# Patient Record
Sex: FEMALE | Race: WHITE | NOT HISPANIC OR LATINO | Employment: OTHER | URBAN - METROPOLITAN AREA
[De-identification: names, ages, dates, MRNs, and addresses within clinical notes are randomized per-mention and may not be internally consistent; named-entity substitution may affect disease eponyms.]

---

## 2023-09-28 DIAGNOSIS — Q27.9 VASCULAR MALFORMATION: Primary | ICD-10-CM

## 2023-10-05 ENCOUNTER — HOSPITAL ENCOUNTER (OUTPATIENT)
Dept: MRI IMAGING | Facility: CLINIC | Age: 68
Discharge: HOME | End: 2023-10-05
Attending: RADIOLOGY | Admitting: RADIOLOGY
Payer: COMMERCIAL

## 2023-10-05 DIAGNOSIS — Q27.9 VASCULAR MALFORMATION: ICD-10-CM

## 2023-10-05 PROCEDURE — A9585 GADOBUTROL INJECTION: HCPCS | Mod: JZ | Performed by: RADIOLOGY

## 2023-10-05 PROCEDURE — 255N000002 HC RX 255 OP 636: Mod: JZ | Performed by: RADIOLOGY

## 2023-10-05 PROCEDURE — 72157 MRI CHEST SPINE W/O & W/DYE: CPT

## 2023-10-05 RX ORDER — GADOBUTROL 604.72 MG/ML
7.5 INJECTION INTRAVENOUS ONCE
Status: COMPLETED | OUTPATIENT
Start: 2023-10-05 | End: 2023-10-05

## 2023-10-05 RX ADMIN — GADOBUTROL 6.5 ML: 604.72 INJECTION INTRAVENOUS at 14:58

## 2023-10-19 PROBLEM — H52.10 MYOPIA: Status: ACTIVE | Noted: 2017-09-07

## 2023-10-19 PROBLEM — N64.59 INVERTED NIPPLE: Status: ACTIVE | Noted: 2023-10-19

## 2023-10-19 PROBLEM — D51.9 B12 DEFICIENCY ANEMIA: Status: ACTIVE | Noted: 2023-08-10

## 2023-10-19 PROBLEM — H25.019 CORTICAL SENILE CATARACT: Status: ACTIVE | Noted: 2022-11-14

## 2023-10-26 ENCOUNTER — OFFICE VISIT (OUTPATIENT)
Dept: VASCULAR SURGERY | Facility: CLINIC | Age: 68
End: 2023-10-26
Payer: COMMERCIAL

## 2023-10-26 VITALS — OXYGEN SATURATION: 93 % | DIASTOLIC BLOOD PRESSURE: 81 MMHG | SYSTOLIC BLOOD PRESSURE: 120 MMHG | HEART RATE: 67 BPM

## 2023-10-26 DIAGNOSIS — D18.09 SPINAL HEMANGIOMA: ICD-10-CM

## 2023-10-26 DIAGNOSIS — D18.09 HEMANGIOMA OF SPINE: Primary | ICD-10-CM

## 2023-10-26 PROCEDURE — 99204 OFFICE O/P NEW MOD 45 MIN: CPT | Mod: GC | Performed by: RADIOLOGY

## 2023-10-26 RX ORDER — CARVEDILOL 25 MG/1
25 TABLET ORAL 2 TIMES DAILY
COMMUNITY

## 2023-10-26 RX ORDER — CYANOCOBALAMIN 1000 UG/ML
1 INJECTION, SOLUTION INTRAMUSCULAR; SUBCUTANEOUS
COMMUNITY
Start: 2023-09-20

## 2023-10-26 ASSESSMENT — PAIN SCALES - GENERAL: PAINLEVEL: NO PAIN (0)

## 2023-10-26 NOTE — LETTER
10/26/2023       RE: Sima Ricardo  [address] [facility] [address]     Dear Colleague,    Thank you for referring your patient, Sima Ricardo, to the [facility] [place] at [facility]. Please see a copy of my visit note below.        INTERVENTIONAL RADIOLOGY CONSULTATION    Name: Sima Ricardo  Age: 68 year old   Referring Physician: Dr. Henderson   REASON FOR REFERRAL: Painful T10 hmangioma     HPI: HPI: Sima first noted mid back pain about 3 years ago. It started in the middle of her back and wrapped around her ribs into her sternum. She then would notice an electrical pain in her groin/legs. She also noted a feeling of water dripping down her back. At first, she thought this was related to muscle pains, as she was going through a move when this first started 3 years ago. Because of that, she underwent therapy and chiropractor care, which only exacerbated her symptoms. She denies ever having bladder or bowel incontinence, symptoms of foot drop, or electrical shock running down her legs. Further work up following conservative therapies included MRI which showed T10 hemangioma. She has seen neurosurgery for this and tried to be seen down at [place] for consult, but no appointments were available.    Now she feels a sensation where something is rolling down her back every day. Sneezing and taking deep breath exacerbates pain in her sternum/chest. The pain is always worse in the middle/late afternoon after a day of activity. It is limiting her daily activities now to the point where she can't walk the 2 miles she used to every day due to pain. She has difficulty sitting on her piano bench to play.    PAST MEDICAL HISTORY:   No past medical history on file.    PAST SURGICAL HISTORY:   No past surgical history on file.    FAMILY HISTORY:   No family history on file.    SOCIAL HISTORY:   Social History     Tobacco Use    Smoking  status: Never    Smokeless tobacco: Never   Substance Use Topics    Alcohol use: Not on file       PROBLEM LIST:   Patient Active Problem List    Diagnosis Date Noted    Hemangioma of spine 10/27/2023     Priority: Medium    Inverted nipple 10/19/2023     Priority: Medium     Formatting of this note might be different from the original. rt slightly      B12 deficiency anemia 08/10/2023     Priority: Medium    Cortical senile cataract 11/14/2022     Priority: Medium    Myopia 09/07/2017     Priority: Medium    Hyperlipidemia 08/04/2016     Priority: Medium     Formatting of this note might be different from the original. 8/5/2016 - declines statin at this time, will work on diet and exercise, recheck labs in 3 months. Formatting of this note might be different from the original. Formatting of this note might be different from the original. 8/5/2016 - declines statin at this time, will work on diet and exercise, recheck labs in 3 months.      Osteopenia 07/16/2015     Priority: Medium    Prediabetes 06/25/2015     Priority: Medium     Formatting of this note might be different from the original. 6/25/2015 Hgb A1C 5.9 Formatting of this note might be different from the original. Formatting of this note might be different from the original. 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 nutrasweet      Deafness in right ear 02/28/2013     Priority: Medium     Formatting of this note might be different from the original. Post Q tip trauma         MEDICATIONS:   Prescription Medications as of 11/13/2023         Rx Number Disp Refills Start End Last Dispensed Date Next Fill Date Owning Pharmacy    Aspirin 81 MG CAPS    7/27/2023        Sig: Take by mouth.     Class: Historical    Route: Oral    carvedilol (COREG) 25 MG tablet            Sig: Take 25 mg by mouth 2 times daily    Class: Historical    Route: Oral    cholecalciferol (VITAMIN D3) 10 mcg (400 units) TABS tablet            Sig: Take 25 mcg by mouth 2 times daily    Class: Historical    Route: Oral    cyanocobalamin (CYANOCOBALAMIN) 1000 MCG/ML injection    9/20/2023        Sig: Inject 1 mL into the muscle every 14 days    Class: Historical    Route: Intramuscular            ALLERGIES:   Aspartame, Cephalexin, and Adhesive tape    ROS:  An 11 point review of system was performed and all pertinent and negative findings are mentioned in the HPI section of this note.      Physical Examination:   VITALS:   /81 (BP Location: Right arm, Patient Position: Chair, Cuff Size: Adult Regular)   Pulse 67   SpO2 93%   General:  Pt sitting in chair comfortably.  No acute distress  HEENT: normocephalic.    Neck: no JVD  Resp: nonlabored.    CV: RRR.   Abd: nontender, soft  Spine: T10 tenderness to palpation, no paraspinal muscle tenderness to palpation. Normal ROM.  Extremities: 5/5 hip and knee flexion and extension. Sensation equal and intact of lower extremities.   Ext/Vasc: No ulcerations or skin changes.  No large varicosities.  Symmetrical 2+ DP and PT pulses.   Lymph: no pedal edema  Neuro: Oriented x3.  No evidence of focal motor deficits  Mood: appropriate affect    Labs/Diagnostic studies:   MR Thoracic Spine 10/5/23  1. Presumed benign hemangioma in the T10 vertebral body.  2. Otherwise, normal thoracic spine MRI.     Assessment   Sima Ricardo is a 68 year old female with lifestyle limiting back pain in the setting of T10 hemangioma. It is difficult to show a definitive causal relationship between the T10 hemangioma and the back pain she is experiencing. Factors that favor contribution of the hemangioma to her back pain symptoms include tenderness of the T10 vertebra to palpation, the lack of other causes  of back pain present on the MRI, as well as her symptom presentation can be similar to others presenting with symptomatic hemangiomas. With that being said, hemangiomas typically don't cause symptoms so this is overall unusual if her hemangioma is symptomatic. This lack of definitive causal relationship was explained to the patient and her  who were understanding. Clinical suspicion, however, favors that the hemangioma is contributing to her back pain, at least in some degree, especially given other causes of back pain can be ruled out on the MRI including radiculopathy, bulging disk, osteoarthritis and paraspinal muscles are not tender to palpation making muscular problems less likely.     The treatment options include percutaneous injections, endovascular embolizations and in my opinion for this particular lesion the optimal option is radiofrequency ablation with vertebral augmentation. The risks of this procedure were described in detail, including but not limited to risk of bleeding, infection, CSF leak, cement extravasation, pathologiocal pain, worsened pain and neurological damage from spinal cord injury. The potential benefits of the procedure were explained, notably a possible reduction in pain. Of course, there is a risk that her back pain does not improve following the procedure if the hemangioma is not contributing to her symptoms. I shared with them that I have been a consultant for [employer] the company that makes the device I would use for their treatment. All their questions were answered. Patient and  understand these risks and are wanting to undergo treatment in the hopes it will help improve her lifestyle limiting back pain. I will also discuss this patient's case with colleagues who perform the other treatments for this entity to make sure we select the most optimal approach for this particular lesion/patient.    Plan   - RFA and biopsy with vertebral augmentation of T10 hemangioma        Tea Cruz  Medical Student, [employer]    Physician Attestation   I, Johnson Brady MD, was present with the medical/REINALDO student who participated in the service and in the documentation of the note.  I have verified the history and personally performed the physical exam and medical decision making.  I agree with the assessment and plan of care as documented in the note.      Items personally reviewed: vitals, labs and imaging and agree with the interpretation documented in the note.          Again, thank you for allowing me to participate in the care of your patient.      Sincerely,    Johnson Brady MD

## 2023-10-26 NOTE — PROGRESS NOTES
INTERVENTIONAL RADIOLOGY CONSULTATION    Name: Sima Ricardo  Age: 68 year old   Referring Physician: Dr. Henderson   REASON FOR REFERRAL: Painful T10 hmangioma     HPI: HPI: Sima first noted mid back pain about 3 years ago. It started in the middle of her back and wrapped around her ribs into her sternum. She then would notice an electrical pain in her groin/legs. She also noted a feeling of water dripping down her back. At first, she thought this was related to muscle pains, as she was going through a move when this first started 3 years ago. Because of that, she underwent therapy and chiropractor care, which only exacerbated her symptoms. She denies ever having bladder or bowel incontinence, symptoms of foot drop, or electrical shock running down her legs. Further work up following conservative therapies included MRI which showed T10 hemangioma. She has seen neurosurgery for this and tried to be seen down at [place] for consult, but no appointments were available.    Now she feels a sensation where something is rolling down her back every day. Sneezing and taking deep breath exacerbates pain in her sternum/chest. The pain is always worse in the middle/late afternoon after a day of activity. It is limiting her daily activities now to the point where she can't walk the 2 miles she used to every day due to pain. She has difficulty sitting on her piano bench to play.    PAST MEDICAL HISTORY:   No past medical history on file.    PAST SURGICAL HISTORY:   No past surgical history on file.    FAMILY HISTORY:   No family history on file.    SOCIAL HISTORY:   Social History     Tobacco Use     Smoking status: Never     Smokeless tobacco: Never   Substance Use Topics     Alcohol use: Not on file       PROBLEM LIST:   Patient Active Problem List    Diagnosis Date Noted     Hemangioma of spine 10/27/2023     Priority: Medium     Inverted nipple 10/19/2023     Priority: Medium     Formatting of this note might be  400mg TID x 5 days    different from the original. 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 8/5/2016 - declines statin at this time, will work on diet and exercise, recheck labs in 3 months. Formatting of this note might be different from the original. Formatting of this note might be different from the original. 8/5/2016 - declines statin at this time, will work on diet and exercise, recheck labs in 3 months.       Osteopenia 07/16/2015     Priority: Medium     Prediabetes 06/25/2015     Priority: Medium     Formatting of this note might be different from the original. 6/25/2015 Hgb A1C 5.9 Formatting of this note might be different from the original. Formatting of this note might be different from the original. 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 nutrasweet       Deafness in right ear 02/28/2013     Priority: Medium     Formatting of this note might be different from the original. Post Q tip trauma         MEDICATIONS:   Prescription Medications as of 11/13/2023       Rx Number Disp Refills Start End Last Dispensed Date Next Fill Date Owning Pharmacy    Aspirin 81 MG CAPS    7/27/2023        Sig: Take by mouth.    Class: Historical    Route: Oral    carvedilol (COREG) 25 MG tablet            Sig: Take 25 mg by mouth 2 times daily    Class: Historical    Route: Oral    cholecalciferol (VITAMIN D3) 10 mcg (400 units) TABS tablet            Sig: Take 25 mcg by mouth 2 times daily    Class: Historical    Route:  Oral    cyanocobalamin (CYANOCOBALAMIN) 1000 MCG/ML injection    9/20/2023        Sig: Inject 1 mL into the muscle every 14 days    Class: Historical    Route: Intramuscular          ALLERGIES:   Aspartame, Cephalexin, and Adhesive tape    ROS:  An 11 point review of system was performed and all pertinent and negative findings are mentioned in the HPI section of this note.      Physical Examination:   VITALS:   /81 (BP Location: Right arm, Patient Position: Chair, Cuff Size: Adult Regular)   Pulse 67   SpO2 93%   General:  Pt sitting in chair comfortably.  No acute distress  HEENT: normocephalic.    Neck: no JVD  Resp: nonlabored.    CV: RRR.   Abd: nontender, soft  Spine: T10 tenderness to palpation, no paraspinal muscle tenderness to palpation. Normal ROM.  Extremities: 5/5 hip and knee flexion and extension. Sensation equal and intact of lower extremities.   Ext/Vasc: No ulcerations or skin changes.  No large varicosities.  Symmetrical 2+ DP and PT pulses.   Lymph: no pedal edema  Neuro: Oriented x3.  No evidence of focal motor deficits  Mood: appropriate affect    Labs/Diagnostic studies:   MR Thoracic Spine 10/5/23  1. Presumed benign hemangioma in the T10 vertebral body.  2. Otherwise, normal thoracic spine MRI.     Assessment   Sima Ricardo is a 68 year old female with lifestyle limiting back pain in the setting of T10 hemangioma. It is difficult to show a definitive causal relationship between the T10 hemangioma and the back pain she is experiencing. Factors that favor contribution of the hemangioma to her back pain symptoms include tenderness of the T10 vertebra to palpation, the lack of other causes of back pain present on the MRI, as well as her symptom presentation can be similar to others presenting with symptomatic hemangiomas. With that being said, hemangiomas typically don't cause symptoms so this is overall unusual if her hemangioma is symptomatic. This lack of definitive causal  relationship was explained to the patient and her  who were understanding. Clinical suspicion, however, favors that the hemangioma is contributing to her back pain, at least in some degree, especially given other causes of back pain can be ruled out on the MRI including radiculopathy, bulging disk, osteoarthritis and paraspinal muscles are not tender to palpation making muscular problems less likely.     The treatment options include percutaneous injections, endovascular embolizations and in my opinion for this particular lesion the optimal option is radiofrequency ablation with vertebral augmentation. The risks of this procedure were described in detail, including but not limited to risk of bleeding, infection, CSF leak, cement extravasation, pathologiocal pain, worsened pain and neurological damage from spinal cord injury. The potential benefits of the procedure were explained, notably a possible reduction in pain. Of course, there is a risk that her back pain does not improve following the procedure if the hemangioma is not contributing to her symptoms. I shared with them that I have been a consultant for [employer] the company that makes the device I would use for their treatment. All their questions were answered. Patient and  understand these risks and are wanting to undergo treatment in the hopes it will help improve her lifestyle limiting back pain. I will also discuss this patient's case with colleagues who perform the other treatments for this entity to make sure we select the most optimal approach for this particular lesion/patient.    Plan   - RFA and biopsy with vertebral augmentation of T10 hemangioma       Tea Cruz  Medical Student, [employer]    Physician Attestation   I, Johnson Brady MD, was present with the medical/REINALDO student who participated in the service and in the documentation of the note.  I have verified the history and personally performed  the physical exam and medical decision making.  I agree with the assessment and plan of care as documented in the note.      Items personally reviewed: vitals, labs and imaging and agree with the interpretation documented in the note.    Johnson Brady MD      CC  Patient Care Team:  Martha Ojeda NP as PCP - CHERRIE Samuels

## 2023-10-26 NOTE — NURSING NOTE
Chief Complaint   Patient presents with    New Patient     The patient states she feels good today aside from her lower back pain at baseline. She states the pain is worsened with physical activity. The patient would like to discuss the hemangioma and receive some education on it and talk about potential treatments. The patient denies any questions or concerns.     Vitals were taken and medications reconciled.    Huber Beauchamp, EMT  10:41 AM

## 2023-10-27 PROBLEM — D18.09 HEMANGIOMA OF SPINE: Status: ACTIVE | Noted: 2023-10-27

## 2023-11-04 ENCOUNTER — HEALTH MAINTENANCE LETTER (OUTPATIENT)
Age: 68
End: 2023-11-04

## 2023-11-14 ENCOUNTER — TELEPHONE (OUTPATIENT)
Dept: INTERVENTIONAL RADIOLOGY/VASCULAR | Facility: CLINIC | Age: 68
End: 2023-11-14

## 2023-11-14 NOTE — TELEPHONE ENCOUNTER
[facility] Call Center    Phone Message    May a detailed message be left on voicemail: no     Reason for Call: Other: Sima Schaefer calling for Ashlie Hess.  Please call [phone]     Action Taken: Message routed to:  [facility] ([facility] Vascular    Travel Screening: Not Applicable

## 2024-02-02 ENCOUNTER — HOSPITAL ENCOUNTER (OUTPATIENT)
Facility: CLINIC | Age: 69
Discharge: HOME | End: 2024-02-02
Attending: RADIOLOGY | Admitting: RADIOLOGY
Payer: COMMERCIAL

## 2024-02-02 ENCOUNTER — APPOINTMENT (OUTPATIENT)
Dept: INTERVENTIONAL RADIOLOGY/VASCULAR | Facility: CLINIC | Age: 69
End: 2024-02-02
Attending: RADIOLOGY
Payer: COMMERCIAL

## 2024-02-02 ENCOUNTER — APPOINTMENT (OUTPATIENT)
Dept: MEDSURG UNIT | Facility: CLINIC | Age: 69
End: 2024-02-02
Attending: RADIOLOGY
Payer: COMMERCIAL

## 2024-02-02 VITALS
RESPIRATION RATE: 16 BRPM | TEMPERATURE: 97.6 F | BODY MASS INDEX: 21.7 KG/M2 | HEART RATE: 55 BPM | WEIGHT: 127.1 LBS | OXYGEN SATURATION: 95 % | DIASTOLIC BLOOD PRESSURE: 76 MMHG | HEIGHT: 64 IN | SYSTOLIC BLOOD PRESSURE: 131 MMHG

## 2024-02-02 DIAGNOSIS — D18.09 SPINAL HEMANGIOMA: ICD-10-CM

## 2024-02-02 LAB
ERYTHROCYTE [DISTWIDTH] IN BLOOD BY AUTOMATED COUNT: 12.9 % (ref 10–15)
HCT VFR BLD AUTO: 38.6 % (ref 35–47)
HGB BLD-MCNC: 12.8 G/DL (ref 11.7–15.7)
INR PPP: 1.05 (ref 0.85–1.15)
MCH RBC QN AUTO: 34.8 PG (ref 26.5–33)
MCHC RBC AUTO-ENTMCNC: 33.2 G/DL (ref 31.5–36.5)
MCV RBC AUTO: 105 FL (ref 78–100)
PLATELET # BLD AUTO: 243 10E3/UL (ref 150–450)
RBC # BLD AUTO: 3.68 10E6/UL (ref 3.8–5.2)
WBC # BLD AUTO: 5.2 10E3/UL (ref 4–11)

## 2024-02-02 PROCEDURE — 999N000142 HC STATISTIC PROCEDURE PREP ONLY

## 2024-02-02 PROCEDURE — 272N000495 HC NEEDLE CR14

## 2024-02-02 PROCEDURE — 20982 ABLATE BONE TUMOR(S) PERQ: CPT

## 2024-02-02 PROCEDURE — C1889 IMPLANT/INSERT DEVICE, NOC: HCPCS

## 2024-02-02 PROCEDURE — 22510 PERQ CERVICOTHORACIC INJECT: CPT | Mod: GC | Performed by: RADIOLOGY

## 2024-02-02 PROCEDURE — 99152 MOD SED SAME PHYS/QHP 5/>YRS: CPT

## 2024-02-02 PROCEDURE — 99152 MOD SED SAME PHYS/QHP 5/>YRS: CPT | Mod: GC | Performed by: RADIOLOGY

## 2024-02-02 PROCEDURE — 272N000721 HC NEEDLE CR33

## 2024-02-02 PROCEDURE — 999N000134 HC STATISTIC PP CARE STAGE 3

## 2024-02-02 PROCEDURE — 250N000011 HC RX IP 250 OP 636: Performed by: RADIOLOGY

## 2024-02-02 PROCEDURE — 22510 PERQ CERVICOTHORACIC INJECT: CPT

## 2024-02-02 PROCEDURE — 250N000011 HC RX IP 250 OP 636: Performed by: NURSE PRACTITIONER

## 2024-02-02 PROCEDURE — 20982 ABLATE BONE TUMOR(S) PERQ: CPT | Mod: GC | Performed by: RADIOLOGY

## 2024-02-02 PROCEDURE — 85610 PROTHROMBIN TIME: CPT | Performed by: NURSE PRACTITIONER

## 2024-02-02 PROCEDURE — 258N000003 HC RX IP 258 OP 636: Performed by: NURSE PRACTITIONER

## 2024-02-02 PROCEDURE — 999N000054 HC STATISTIC EKG NON-CHARGEABLE

## 2024-02-02 PROCEDURE — 272N000718 HC KIT CR34

## 2024-02-02 PROCEDURE — 36415 COLL VENOUS BLD VENIPUNCTURE: CPT | Performed by: NURSE PRACTITIONER

## 2024-02-02 PROCEDURE — 250N000013 HC RX MED GY IP 250 OP 250 PS 637: Performed by: RADIOLOGY

## 2024-02-02 PROCEDURE — 85027 COMPLETE CBC AUTOMATED: CPT | Performed by: NURSE PRACTITIONER

## 2024-02-02 RX ORDER — NALOXONE HYDROCHLORIDE 0.4 MG/ML
0.4 INJECTION, SOLUTION INTRAMUSCULAR; INTRAVENOUS; SUBCUTANEOUS
Status: DISCONTINUED | OUTPATIENT
Start: 2024-02-02 | End: 2024-02-02 | Stop reason: HOSPADM

## 2024-02-02 RX ORDER — SODIUM CHLORIDE 9 MG/ML
INJECTION, SOLUTION INTRAVENOUS CONTINUOUS
Status: DISCONTINUED | OUTPATIENT
Start: 2024-02-02 | End: 2024-02-02 | Stop reason: HOSPADM

## 2024-02-02 RX ORDER — ONDANSETRON 2 MG/ML
4 INJECTION INTRAMUSCULAR; INTRAVENOUS
Status: DISCONTINUED | OUTPATIENT
Start: 2024-02-02 | End: 2024-02-02 | Stop reason: HOSPADM

## 2024-02-02 RX ORDER — BUPIVACAINE HYDROCHLORIDE 5 MG/ML
10 INJECTION, SOLUTION PERINEURAL ONCE
Status: COMPLETED | OUTPATIENT
Start: 2024-02-02 | End: 2024-02-02

## 2024-02-02 RX ORDER — NALOXONE HYDROCHLORIDE 0.4 MG/ML
0.2 INJECTION, SOLUTION INTRAMUSCULAR; INTRAVENOUS; SUBCUTANEOUS
Status: DISCONTINUED | OUTPATIENT
Start: 2024-02-02 | End: 2024-02-02 | Stop reason: HOSPADM

## 2024-02-02 RX ORDER — LIDOCAINE 40 MG/G
CREAM TOPICAL
Status: DISCONTINUED | OUTPATIENT
Start: 2024-02-02 | End: 2024-02-02 | Stop reason: HOSPADM

## 2024-02-02 RX ORDER — CEFAZOLIN SODIUM 2 G/100ML
2 INJECTION, SOLUTION INTRAVENOUS
Status: COMPLETED | OUTPATIENT
Start: 2024-02-02 | End: 2024-02-02

## 2024-02-02 RX ORDER — FENTANYL CITRATE 50 UG/ML
25-50 INJECTION, SOLUTION INTRAMUSCULAR; INTRAVENOUS EVERY 5 MIN PRN
Status: DISCONTINUED | OUTPATIENT
Start: 2024-02-02 | End: 2024-02-02 | Stop reason: HOSPADM

## 2024-02-02 RX ORDER — ACETAMINOPHEN 500 MG
1000 TABLET ORAL ONCE
Status: COMPLETED | OUTPATIENT
Start: 2024-02-02 | End: 2024-02-02

## 2024-02-02 RX ORDER — KETOROLAC TROMETHAMINE 30 MG/ML
15 INJECTION, SOLUTION INTRAMUSCULAR; INTRAVENOUS ONCE
Status: COMPLETED | OUTPATIENT
Start: 2024-02-02 | End: 2024-02-02

## 2024-02-02 RX ORDER — FLUMAZENIL 0.1 MG/ML
0.2 INJECTION, SOLUTION INTRAVENOUS
Status: DISCONTINUED | OUTPATIENT
Start: 2024-02-02 | End: 2024-02-02 | Stop reason: HOSPADM

## 2024-02-02 RX ADMIN — CEFAZOLIN SODIUM 2 G: 2 INJECTION, SOLUTION INTRAVENOUS at 10:06

## 2024-02-02 RX ADMIN — SODIUM CHLORIDE: 9 INJECTION, SOLUTION INTRAVENOUS at 10:01

## 2024-02-02 RX ADMIN — FENTANYL CITRATE 25 MCG: 50 INJECTION, SOLUTION INTRAMUSCULAR; INTRAVENOUS at 11:44

## 2024-02-02 RX ADMIN — KETOROLAC TROMETHAMINE 15 MG: 30 INJECTION, SOLUTION INTRAMUSCULAR; INTRAVENOUS at 09:59

## 2024-02-02 RX ADMIN — FENTANYL CITRATE 25 MCG: 50 INJECTION, SOLUTION INTRAMUSCULAR; INTRAVENOUS at 11:58

## 2024-02-02 RX ADMIN — BUPIVACAINE HYDROCHLORIDE 50 MG: 5 INJECTION, SOLUTION EPIDURAL; INTRACAUDAL; PERINEURAL at 12:27

## 2024-02-02 RX ADMIN — FENTANYL CITRATE 25 MCG: 50 INJECTION, SOLUTION INTRAMUSCULAR; INTRAVENOUS at 11:40

## 2024-02-02 RX ADMIN — MIDAZOLAM 0.5 MG: 1 INJECTION INTRAMUSCULAR; INTRAVENOUS at 11:53

## 2024-02-02 RX ADMIN — FENTANYL CITRATE 25 MCG: 50 INJECTION, SOLUTION INTRAMUSCULAR; INTRAVENOUS at 11:53

## 2024-02-02 RX ADMIN — FENTANYL CITRATE 25 MCG: 50 INJECTION, SOLUTION INTRAMUSCULAR; INTRAVENOUS at 12:45

## 2024-02-02 RX ADMIN — MIDAZOLAM 0.5 MG: 1 INJECTION INTRAMUSCULAR; INTRAVENOUS at 11:44

## 2024-02-02 RX ADMIN — MIDAZOLAM 0.5 MG: 1 INJECTION INTRAMUSCULAR; INTRAVENOUS at 11:40

## 2024-02-02 RX ADMIN — ACETAMINOPHEN 1000 MG: 500 TABLET ORAL at 15:48

## 2024-02-02 RX ADMIN — MIDAZOLAM 0.5 MG: 1 INJECTION INTRAMUSCULAR; INTRAVENOUS at 12:14

## 2024-02-02 RX ADMIN — FENTANYL CITRATE 25 MCG: 50 INJECTION, SOLUTION INTRAMUSCULAR; INTRAVENOUS at 12:14

## 2024-02-02 RX ADMIN — MIDAZOLAM 0.5 MG: 1 INJECTION INTRAMUSCULAR; INTRAVENOUS at 11:59

## 2024-02-02 ASSESSMENT — ACTIVITIES OF DAILY LIVING (ADL)
ADLS_ACUITY_SCORE: 35

## 2024-02-02 NOTE — PROGRESS NOTES
Tolerated bedrest without issues.  Tolerated food, fluids, ambulation and urination.  Denies pain like it was prior to procedure; however, has a dull pain/pressure rated 3/10 in low back now.  Tylenol given with relief.  Reviewed discharge instructions with Halima and her .  Discharge to home with .

## 2024-02-02 NOTE — PRE-PROCEDURE
GENERAL PRE-PROCEDURE:   Procedure:  T10 RFA/augmentation  Date/Time:  2/2/2024 10:49 AM    Verbal consent obtained?: Yes    Written consent obtained?: Yes    Risks and benefits: Risks, benefits and alternatives were discussed    Consent given by:  Patient  Patient states understanding of procedure being performed: Yes    Patient's understanding of procedure matches consent: Yes    Procedure consent matches procedure scheduled: Yes    Expected level of sedation:  Moderate  Appropriately NPO:  Yes  ASA Class:  1  Mallampati  :  Grade 1- soft palate, uvula, tonsillar pillars, and posterior pharyngeal wall visible  Lungs:  Lungs clear with good breath sounds bilaterally  Heart:  Normal heart sounds and rate  History & Physical reviewed:  History and physical reviewed and no updates needed  Statement of review:  I have reviewed the lab findings, diagnostic data, medications, and the plan for sedation

## 2024-02-02 NOTE — IR NOTE
Patient Name: Sima Ricardo  Medical Record Number: [id number]  Today's Date: 2/2/2024    Procedure: T10 vertebral radiofrequency ablation and augmentation  Proceduralist: Dr. Brady and Dr. Rios  Pathology present: NA    Procedure Start: 1144  Procedure end: 1247  Sedation medications administered:    Fentanyl 150 mcg   Midazolam 2.5 mg    Report given to: Rosie FERNANDES 2A  : SHEMAR    Other Notes: Pt arrived to IR room 3 from . Consent reviewed. Pt denies any questions or concerns regarding procedure. Pt positioned prone and monitored per protocol. Pt tolerated procedure without any noted complications. Pt transferred back to .    Dermabond to 2 puncture site on middle back

## 2024-02-02 NOTE — PROGRESS NOTES
Returned from IR s/p radio frequency ablation of T-10 hemangioma.  Mid back puncture sites X2; Dermabond to both sites; clean and dry.  Dr. Brady at bedside updating patient and family.  Denies pain.  Dozing in bed comfortably.

## 2024-02-02 NOTE — PROGRESS NOTES
Arrived from home for a RFA and Vertebraplasty.  VSS.  C/O chronic discomfort/pain across mid back, and wrapping around to chest.  PIV placed.  H&P current.  Consent obtained.  Ready for procedure.

## 2024-02-02 NOTE — PROCEDURES
[facility]    Procedure: IR Procedure Note    Date/Time: 2/2/2024 12:55 PM    Performed by: Junior Rios MD  Authorized by: Johnson Brady MD      UNIVERSAL PROTOCOL   Site Marked: NA  Prior Images Obtained and Reviewed:  Yes  Required items: Required blood products, implants, devices and special equipment available    Patient identity confirmed:  Verbally with patient, arm band, provided demographic data and hospital-assigned identification number  Patient was reevaluated immediately before administering moderate or deep sedation or anesthesia  Confirmation Checklist:  Patient's identity using two indicators, relevant allergies, procedure was appropriate and matched the consent or emergent situation and correct equipment/implants were available  Time out: Immediately prior to the procedure a time out was called    Universal Protocol: the Joint Commission Universal Protocol was followed    Preparation: Patient was prepped and draped in usual sterile fashion       ANESTHESIA    Anesthesia:  Local infiltration  Local Anesthetic:  Lidocaine 1% without epinephrine    See dictated procedure note for full details.  Findings: T10 RFA and Vertebroplasty     Specimens: none    Complications: None    Condition: Stable    Plan: T10 RFA and Vertebroplasty       PROCEDURE  Describe Procedure: T10 RFA and Vertebroplasty   Patient Tolerance:  Patient tolerated the procedure well with no immediate complications  Length of time physician/provider present for 1:1 monitoring during sedation: 30

## 2024-02-02 NOTE — DISCHARGE INSTRUCTIONS
[facility]    Interventional Radiology  Patient Instructions Following Radio Frequency Ablation of Hemangioma    AFTER YOU GO HOME  If you were given sedation, for the first 24 hours: we recommend that an adult stay with you, DO NOT drive or operate machinery at home or at work, DO NOT smoke, DO NOT make any important or legal decisions.  DO NOT drink alcoholic beverages the day of your procedure  Drink plenty of fluids   Resume your regular diet, unless otherwise instructed by your Primary Physician  Relax and take it easy for 48 hours  DO NOT do any strenuous exercise or lifting (> 10 lbs) for at least 2 days following your procedure  Do not take a shower for at least 12 hours following your procedure. No tub bath, hot tub, or swimming for 3 days.  Your wound sites are covered with DermaBond skin Glue.  Do not put lotion's, powders or creams on glued sites for 10 days.  If Glue is still present in 10 days, you may remove it.  Glue should fall off on it's own.      CALL THE PHYSICIAN IF:  You start bleeding from the procedure site.  If you do start to bleed from that site, lie down flat and hold pressure on the site for a minimum of 10 minutes.  Your physician will tell you if you need to return to the hospital  You develop nausea or vomiting  You have excessive swelling, redness, or tenderness at the site  You have drainage that looks like it is infected.  You experience severe pain  You develop hives or a rash or unexplained itching  You develop shortness of breath  You develop a temperature of 101 degrees F or greater        [facility] INTERVENTIONAL RADIOLOGY DEPARTMENT  Procedure Physician: Richa Brady and Blanca                                    Date of procedure: February 2, 2024  Telephone Numbers: [phone]      Monday-Friday 7:30 am to 4:00 pm  [phone] After 4:00 pm Monday-Friday, Weekends & Holidays.   Ask for the Interventional Radiologist on call.  Someone is on call 24  hrs/day  [facility] toll free number: [phone] Monday-Friday 8:00 am to 4:30 pm  [facility] Emergency Dept: [phone]

## 2024-02-05 ENCOUNTER — TELEPHONE (OUTPATIENT)
Dept: VASCULAR SURGERY | Facility: CLINIC | Age: 69
End: 2024-02-05
Payer: COMMERCIAL

## 2024-02-05 NOTE — TELEPHONE ENCOUNTER
I called and spoke with Sima.  She is getting better every day.  Saturday she was having quite a bit of back pain, then today it's better.  She has gone from taking 500 mg Extra strength tylenol and 2 ibuprofen every 4-5 hours to only taking one 500 mg tylenol this afternoon.  She feels her pain has changed, she no longer has that rolling pain she had pre procedure, now is more of a stiffness and tenderness at the access sites.  She is very encouraged and grateful for her experience.  Pt will be scheduled for her follow up in 1 month.   Thanks,     SYED Hess RN, BSN  Interventional Radiology Care Coordinator   Phone:  [phone]

## 2024-02-08 ENCOUNTER — TELEPHONE (OUTPATIENT)
Dept: VASCULAR SURGERY | Facility: CLINIC | Age: 69
End: 2024-02-08
Payer: COMMERCIAL

## 2024-03-05 NOTE — PROGRESS NOTES
"    INTERVENTIONAL RADIOLOGY CLINIC NOTE  03/05/24    Impression/Plan:  Sima Ricardo is a 69 year old female who presents today to a virtual IR video clinic for a 1-month follow-up in regards to her T10 hemangioma radiofrequency and ablation on 2/2/24 by Dr. Johnson Brady. Patient is incredibly grateful for Dr. Brady and states that her procedure was a \"positive outcome\" and that she can do \"so much more\"! She says that the first week was a \"slow go\" but that by the two week wood post-op everything was better (back pain ofr T10, nerve pain, chest pain). Immediately post-op her  noticed that patient's back was straighter and she wasn't hunched over anymore. Patient just complains of minor muscle aches and some nerve pain in her hips that she says is going away. She is very please with the outcome of her procedure, stating that she has been able to do household chores with ease (no longer has to stop and rest) and is excited to get outside this spring.    No further IR intervention at this time. Patient has IR contact info if symptoms present again or if she has any questions/concerns.    Indication/History:  Sima Ricardo is a 69 year old female with lifestyle limiting back pain in the setting of T10 hemangioma. Patient's back pain she was experiencing prior to IR intervention was difficult to definitively determine whether it was caused by the T10 hemangioma. Factors that favor contribution of the hemangioma to her back pain symptoms include tenderness of the T10 vertebra to palpation, the lack of other causes of back pain present on the MRI, as well as patient's symptom presentation being similar to others presenting with symptomatic hemangiomas. Clinical suspicion favored that patient's hemangioma was contributing to her back pain, at least in some degree. Patient states that she had been dealing with this back pain for 3 years.       Keisha Sharp PA-C  Interventional " Radiology  =====    Past Medical History:  No past medical history on file.    Past Surgical History:  Past Surgical History:   Procedure Laterality Date    IR BONE ABLATION RFA  2/2/2024    IR THORACIC VERTEBROPLASTY  2/2/2024       Problem List:  Patient Active Problem List    Diagnosis Date Noted    Hemangioma of spine 10/27/2023     Priority: Medium    Inverted nipple 10/19/2023     Priority: Medium     Formatting of this note might be different from the original. rt slightly      B12 deficiency anemia 08/10/2023     Priority: Medium    Cortical senile cataract 11/14/2022     Priority: Medium    Myopia 09/07/2017     Priority: Medium    Hyperlipidemia 08/04/2016     Priority: Medium     Formatting of this note might be different from the original. 8/5/2016 - declines statin at this time, will work on diet and exercise, recheck labs in 3 months. Formatting of this note might be different from the original. Formatting of this note might be different from the original. 8/5/2016 - declines statin at this time, will work on diet and exercise, recheck labs in 3 months.      Osteopenia 07/16/2015     Priority: Medium    Prediabetes 06/25/2015     Priority: Medium     Formatting of this note might be different from the original. 6/25/2015 Hgb A1C 5.9 Formatting of this note might be different from the original. Formatting of this note might be different from the original. 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 nutrasweet      Deafness in right ear 02/28/2013     Priority: Medium     Formatting of this note might be different from the original. Post Q tip trauma         Medications:  Prescription Medications as of 3/5/2024         Rx Number Disp  Refills Start End Last Dispensed Date Next Fill Date Owning Pharmacy    Aspirin 81 MG CAPS  -- -- 7/27/2023 --       Sig: Take by mouth.    Class: Historical    Route: Oral    carvedilol (COREG) 25 MG tablet  -- --  --       Sig: Take 25 mg by mouth 2 times daily    Class: Historical    Route: Oral    cholecalciferol (VITAMIN D3) 10 mcg (400 units) TABS tablet  -- --  --       Sig: Take 25 mcg by mouth 2 times daily    Class: Historical    Route: Oral    cyanocobalamin (CYANOCOBALAMIN) 1000 MCG/ML injection  -- -- 9/20/2023 --       Sig: Inject 1 mL into the muscle every 14 days    Class: Historical    Route: Intramuscular            Allergies:  Allergies   Allergen Reactions    Aspartame      Other Reaction(s): *Unknown    Diaphoretic , GI cramps    Cephalexin Dizziness and Unknown    Adhesive Tape Other (See Comments)     Blisters and itching at 2/7/2022 right breast stereo bx site, skin prep and steri strips applied       Results Reviewed:  Lab Results   Component Value Date     02/02/2024     Lab Results   Component Value Date    INR 1.05 02/02/2024       Vital Signs:  There were no vitals taken for this visit.    =====    A total of 30 minutes was spent with the patient. Greater than 50% of the time was spent in counseling, education, and coordination of care.    CC:  1) Referring Provider  No referring provider defined for this encounter.    2) Primary Care Provider  Martha Ojeda [place]  [id number] [place] [address]

## 2024-03-06 ENCOUNTER — VIRTUAL VISIT (OUTPATIENT)
Dept: VASCULAR SURGERY | Facility: CLINIC | Age: 69
End: 2024-03-06
Payer: COMMERCIAL

## 2024-03-06 DIAGNOSIS — D18.09 HEMANGIOMA OF SPINE: Primary | ICD-10-CM

## 2024-03-06 PROCEDURE — 99213 OFFICE O/P EST LOW 20 MIN: CPT | Mod: 95

## 2024-03-06 RX ORDER — MOXIFLOXACIN 5 MG/ML
1 SOLUTION/ DROPS OPHTHALMIC
COMMUNITY
Start: 2024-03-04

## 2024-03-06 ASSESSMENT — PAIN SCALES - GENERAL: PAINLEVEL: MILD PAIN (3)

## 2024-03-06 NOTE — NURSING NOTE
Chief Complaint   Patient presents with    Follow Up     1 month follow up, no updates per pt. Medications/allergies reconciled and reviewed by pt via [employer], no changes per pt. No pt reported vitals today per pt.       Is the patient currently in the state of [place]? YES    Visit mode:VIDEO    If the visit is dropped, the patient can be reconnected by: VIDEO VISIT: Text to cell phone:   Telephone Information:   Mobile [phone]       Will anyone else be joining the visit? YES:  Mundo will be joining visit today per pt.  (If patient encounters technical issues they should call [phone]    How would you like to obtain your AVS? MyChart    Are changes needed to the allergy or medication list? No, Pt stated no changes to allergies, and Pt stated no med changes    Patient denies any changes since echeck-in regarding medication and allergies and states all information entered during echeck-in remains accurate.    Kimberly Nuno, Visit Facilitator/MA.

## 2024-03-06 NOTE — PROGRESS NOTES
[facility]      Type of Visit: Virtual: Kathia    Patient is here for a return visit to discuss  post-op recovery following IR T10 hemangioma RFA and vertebroplasty .     Vitals - Patient Reported  Pain Score: Mild Pain (3)  Pain Loc: Eye (left)      Vitals:  No vitals were obtained today due to virtual visit.      Questions patient would like addressed today are: Patient verbalized no questions/concerns, this has been communicated to the provider.     Refills are needed: No    How would you like to obtain your AVS? MyChart      Virtual Visit Details    Type of service:  Video Visit   Video Start Time:  11:00 am  Video End Time: 11:09 am    Originating Location (pt. Location): Home  Distant Location (provider location):  On-site  Platform used for Video Visit: Kathia

## 2024-12-22 ENCOUNTER — HEALTH MAINTENANCE LETTER (OUTPATIENT)
Age: 69
End: 2024-12-22

## (undated) RX ORDER — ACETAMINOPHEN 500 MG
TABLET ORAL
Status: DISPENSED
Start: 2024-02-02

## (undated) RX ORDER — LIDOCAINE HYDROCHLORIDE 10 MG/ML
INJECTION, SOLUTION EPIDURAL; INFILTRATION; INTRACAUDAL; PERINEURAL
Status: DISPENSED
Start: 2024-02-02

## (undated) RX ORDER — FENTANYL CITRATE 50 UG/ML
INJECTION, SOLUTION INTRAMUSCULAR; INTRAVENOUS
Status: DISPENSED
Start: 2024-02-02

## (undated) RX ORDER — KETOROLAC TROMETHAMINE 30 MG/ML
INJECTION, SOLUTION INTRAMUSCULAR; INTRAVENOUS
Status: DISPENSED
Start: 2024-02-02

## (undated) RX ORDER — BUPIVACAINE HYDROCHLORIDE 5 MG/ML
INJECTION, SOLUTION EPIDURAL; INTRACAUDAL
Status: DISPENSED
Start: 2024-02-02

## (undated) RX ORDER — CEFAZOLIN SODIUM 2 G/100ML
INJECTION, SOLUTION INTRAVENOUS
Status: DISPENSED
Start: 2024-02-02